# Patient Record
Sex: MALE | Race: WHITE | Employment: OTHER | URBAN - METROPOLITAN AREA
[De-identification: names, ages, dates, MRNs, and addresses within clinical notes are randomized per-mention and may not be internally consistent; named-entity substitution may affect disease eponyms.]

---

## 2018-10-31 ENCOUNTER — APPOINTMENT (OUTPATIENT)
Dept: GENERAL RADIOLOGY | Age: 78
End: 2018-10-31
Attending: EMERGENCY MEDICINE
Payer: MEDICARE

## 2018-10-31 ENCOUNTER — HOSPITAL ENCOUNTER (EMERGENCY)
Age: 78
Discharge: HOME | End: 2018-10-31
Attending: EMERGENCY MEDICINE
Payer: MEDICARE

## 2018-10-31 VITALS
WEIGHT: 190 LBS | HEIGHT: 74 IN | HEART RATE: 60 BPM | DIASTOLIC BLOOD PRESSURE: 66 MMHG | SYSTOLIC BLOOD PRESSURE: 121 MMHG | OXYGEN SATURATION: 98 % | BODY MASS INDEX: 24.38 KG/M2 | RESPIRATION RATE: 18 BRPM | TEMPERATURE: 98 F

## 2018-10-31 DIAGNOSIS — R07.9 NONSPECIFIC CHEST PAIN: Primary | ICD-10-CM

## 2018-10-31 PROBLEM — F03.90 DEMENTIA (HCC): Status: ACTIVE | Noted: 2018-10-31

## 2018-10-31 LAB
ALBUMIN SERPL-MCNC: 3.6 G/DL (ref 3.2–4.6)
ALBUMIN/GLOB SERPL: 0.8 {RATIO} (ref 1.2–3.5)
ALP SERPL-CCNC: 59 U/L (ref 50–136)
ALT SERPL-CCNC: 21 U/L (ref 12–65)
ANION GAP SERPL CALC-SCNC: 6 MMOL/L (ref 7–16)
AST SERPL-CCNC: 12 U/L (ref 15–37)
ATRIAL RATE: 73 BPM
BASOPHILS # BLD: 0 K/UL (ref 0–0.2)
BASOPHILS NFR BLD: 0 % (ref 0–2)
BILIRUB SERPL-MCNC: 0.5 MG/DL (ref 0.2–1.1)
BNP SERPL-MCNC: 36 PG/ML
BUN SERPL-MCNC: 15 MG/DL (ref 8–23)
CALCIUM SERPL-MCNC: 9.3 MG/DL (ref 8.3–10.4)
CALCULATED P AXIS, ECG09: 112 DEGREES
CALCULATED R AXIS, ECG10: -100 DEGREES
CALCULATED T AXIS, ECG11: 73 DEGREES
CHLORIDE SERPL-SCNC: 107 MMOL/L (ref 98–107)
CO2 SERPL-SCNC: 30 MMOL/L (ref 21–32)
CREAT SERPL-MCNC: 1.14 MG/DL (ref 0.8–1.5)
DIAGNOSIS, 93000: NORMAL
DIFFERENTIAL METHOD BLD: ABNORMAL
EOSINOPHIL # BLD: 0.4 K/UL (ref 0–0.8)
EOSINOPHIL NFR BLD: 4 % (ref 0.5–7.8)
ERYTHROCYTE [DISTWIDTH] IN BLOOD BY AUTOMATED COUNT: 12.8 %
GLOBULIN SER CALC-MCNC: 4.3 G/DL (ref 2.3–3.5)
GLUCOSE SERPL-MCNC: 75 MG/DL (ref 65–100)
HCT VFR BLD AUTO: 45.3 % (ref 41.1–50.3)
HGB BLD-MCNC: 14.6 G/DL (ref 13.6–17.2)
IMM GRANULOCYTES # BLD: 0 K/UL (ref 0–0.5)
IMM GRANULOCYTES NFR BLD AUTO: 0 % (ref 0–5)
LYMPHOCYTES # BLD: 1.5 K/UL (ref 0.5–4.6)
LYMPHOCYTES NFR BLD: 15 % (ref 13–44)
MCH RBC QN AUTO: 33.2 PG (ref 26.1–32.9)
MCHC RBC AUTO-ENTMCNC: 32.2 G/DL (ref 31.4–35)
MCV RBC AUTO: 103 FL (ref 79.6–97.8)
MONOCYTES # BLD: 0.8 K/UL (ref 0.1–1.3)
MONOCYTES NFR BLD: 9 % (ref 4–12)
NEUTS SEG # BLD: 6.7 K/UL (ref 1.7–8.2)
NEUTS SEG NFR BLD: 71 % (ref 43–78)
NRBC # BLD: 0 K/UL (ref 0–0.2)
P-R INTERVAL, ECG05: 136 MS
PLATELET # BLD AUTO: 293 K/UL (ref 150–450)
PMV BLD AUTO: 10 FL (ref 9.4–12.3)
POTASSIUM SERPL-SCNC: 4 MMOL/L (ref 3.5–5.1)
PROT SERPL-MCNC: 7.9 G/DL (ref 6.3–8.2)
Q-T INTERVAL, ECG07: 462 MS
QRS DURATION, ECG06: 160 MS
QTC CALCULATION (BEZET), ECG08: 508 MS
RBC # BLD AUTO: 4.4 M/UL (ref 4.23–5.6)
SODIUM SERPL-SCNC: 143 MMOL/L (ref 136–145)
TROPONIN I BLD-MCNC: 0.01 NG/ML (ref 0.02–0.05)
TROPONIN I SERPL-MCNC: <0.02 NG/ML (ref 0.02–0.05)
VENTRICULAR RATE, ECG03: 73 BPM
WBC # BLD AUTO: 9.5 K/UL (ref 4.3–11.1)

## 2018-10-31 PROCEDURE — 83880 ASSAY OF NATRIURETIC PEPTIDE: CPT

## 2018-10-31 PROCEDURE — 71046 X-RAY EXAM CHEST 2 VIEWS: CPT

## 2018-10-31 PROCEDURE — 84484 ASSAY OF TROPONIN QUANT: CPT

## 2018-10-31 PROCEDURE — 99285 EMERGENCY DEPT VISIT HI MDM: CPT | Performed by: EMERGENCY MEDICINE

## 2018-10-31 PROCEDURE — 80053 COMPREHEN METABOLIC PANEL: CPT

## 2018-10-31 PROCEDURE — 85025 COMPLETE CBC W/AUTO DIFF WBC: CPT

## 2018-10-31 PROCEDURE — 93005 ELECTROCARDIOGRAM TRACING: CPT | Performed by: EMERGENCY MEDICINE

## 2018-10-31 RX ORDER — NITROGLYCERIN 0.4 MG/1
0.4 TABLET SUBLINGUAL
Qty: 1 BOTTLE | Refills: 3 | Status: SHIPPED | OUTPATIENT
Start: 2018-10-31 | End: 2021-01-21 | Stop reason: SDUPTHER

## 2018-10-31 NOTE — ED NOTES
Cardiology consultation obtained. Repeat troponin was ordered and was negative patient will be followed up as an outpatient.

## 2018-10-31 NOTE — ED TRIAGE NOTES
Patient states that he is having chest pain and shortness of breath. Reports that he was seen at [facility] this weekend for the same and had a EKG and blood work. Patient d/c from that facility and told to follow up with cardiology. Patient with pacemaker/defibilator that has \"not gone off. \"

## 2018-10-31 NOTE — DISCHARGE INSTRUCTIONS

## 2018-10-31 NOTE — ED NOTES
I have reviewed discharge instructions with the patient. The patient verbalized understanding. Patient left ED via Discharge Method: ambulatory to Home with self. Opportunity for questions and clarification provided. Patient given 1 scripts. To continue your aftercare when you leave the hospital, you may receive an automated call from our care team to check in on how you are doing. This is a free service and part of our promise to provide the best care and service to meet your aftercare needs.  If you have questions, or wish to unsubscribe from this service please call [phone]. Thank you for Choosing our [facility] Emergency Department.

## 2018-10-31 NOTE — ED PROVIDER NOTES
[facility] pain and shortness of breath. The patient has a history of a nonischemic dilated cardiomyopathy and had a pacemaker defibrillator implanted 3-4 years ago. He's had no issues of recent illnesses or changes in medications but had an episode of chest discomfort or shortness of breath 4 days ago was seen at another emergency room and evaluated and released without evidence of ischemic disease. He had a second episode again this morning of unknown duration due to history of dementia. Patient is currently asymptomatic. The history is provided by the patient and the spouse. Chest Pain This is a recurrent problem. The current episode started more than 2 days ago. The problem has been resolved. The problem occurs every several days. The pain is associated with rest and normal activity. The patient is experiencing no pain. The quality of the pain is described as pressure-like. The pain does not radiate. Associated symptoms include shortness of breath. Pertinent negatives include no abdominal pain, no back pain, no claudication, no cough, no diaphoresis, no dizziness, no exertional chest pressure, no fever, no headaches, no hemoptysis, no irregular heartbeat, no leg pain, no lower extremity edema, no malaise/fatigue, no nausea, no near-syncope, no numbness, no orthopnea, no palpitations, no PND, no sputum production, no vomiting and no weakness. He has tried nothing for the symptoms. The treatment provided significant relief. Risk factors include cardiac disease. Past Medical History:  
Diagnosis Date  Arrhythmia PVVs  
 Chest pain  Dilated cardiomyopathy ([facility] 2/2/2016  Fatigue  Heart failure ([facility]  Heart murmur 2/2/2016  HLD (hyperlipidemia) 2/2/2016  Hypertension  LBBB (left bundle branch block) 2/2/2016  Palpitations  Psychiatric disorder  Ventricular premature beats 2/2/2016  Ventricular tachycardia ([facility] [address] 2016 Past Surgical History:  
Procedure Laterality Date  HX APPENDECTOMY  HX HEART CATHETERIZATION    
 HX ORTHOPAEDIC  2005  
 left total knee replacement Family History:  
Problem Relation Age of Onset  Stroke Mother  Heart Disease Father  of MI at 56yrs Social History Socioeconomic History  Marital status:  Spouse name: Not on file  Number of children: Not on file  Years of education: Not on file  Highest education level: Not on file Social Needs  Financial resource strain: Not on file  Food insecurity - worry: Not on file  Food insecurity - inability: Not on file  Transportation needs - medical: Not on file  Transportation needs - non-medical: Not on file Occupational History  Not on file Tobacco Use  Smoking status: Former Smoker Packs/day: 0.25 Last attempt to quit: 6/3/1975 Years since quittin.4  Smokeless tobacco: Never Used  Tobacco comment: Stopped smoking many years ago Substance and Sexual Activity  Alcohol use: Yes Comment: occ  Drug use: Not on file  Sexual activity: Not on file Other Topics Concern  Not on file Social History Narrative  Not on file ALLERGIES: Patient has no known allergies. Review of Systems Constitutional: Negative for diaphoresis, fever and malaise/fatigue. Respiratory: Positive for shortness of breath. Negative for cough, hemoptysis and sputum production. Cardiovascular: Positive for chest pain. Negative for palpitations, orthopnea, claudication, PND and near-syncope. Gastrointestinal: Negative for abdominal pain, nausea and vomiting. Musculoskeletal: Negative for back pain. Neurological: Negative for dizziness, weakness, numbness and headaches. All other systems reviewed and are negative. Vitals:  
 10/31/18 4869 BP: 133/77 Pulse: 72 Resp: 18  
 Temp: 97.5 °F (36.4 °C) SpO2: 97% Weight: 86.2 kg (190 lb) Height: 6' 2\" (1.88 m) Physical Exam  
Constitutional: He is oriented to person, place, and time. He appears well-developed and well-nourished. HENT:  
Head: Normocephalic and atraumatic. Eyes: EOM are normal. Pupils are equal, round, and reactive to light. Neck: Normal range of motion. Neck supple. Cardiovascular: Normal rate, regular rhythm and normal pulses. No extrasystoles are present. No systolic murmur is present. No diastolic murmur is present. Pulmonary/Chest: Effort normal and breath sounds normal.  
Abdominal: Soft. Bowel sounds are normal. He exhibits no distension. There is no tenderness. Musculoskeletal: Normal range of motion. Right lower leg: Normal. He exhibits no tenderness and no edema. Left lower leg: Normal. He exhibits no tenderness and no edema. Neurological: He is alert and oriented to person, place, and time. Skin: Skin is warm and dry. Capillary refill takes less than 2 seconds. Psychiatric: He has a normal mood and affect. His behavior is normal.  
Nursing note and vitals reviewed. MDM Number of Diagnoses or Management Options Amount and/or Complexity of Data Reviewed Clinical lab tests: ordered and reviewed Tests in the radiology section of CPT®: ordered and reviewed Independent visualization of images, tracings, or specimens: yes Risk of Complications, Morbidity, and/or Mortality Presenting problems: moderate Diagnostic procedures: moderate Management options: moderate Patient Progress Patient progress: stable Procedures

## 2022-03-18 PROBLEM — R07.9 CHEST PAIN: Status: ACTIVE | Noted: 2018-10-31

## 2022-03-18 PROBLEM — F03.90 DEMENTIA (HCC): Status: ACTIVE | Noted: 2018-10-31

## 2023-12-13 NOTE — CONSULTS
Addended by: LOUIS GARCIA on: 12/13/2023 09:24 AM     Modules accepted: Orders     [facility] Cardiology Consult     Attending Cardiologist:Dr. Venessa Malone     Primary Cardiologist:Dr. Ronda Lieberman    Primary Care Physician:Dr. Luis Hawkins    Subjective:     Elizabeth Sandoval is a 70 y.o. male with known hx of NICM, [place] BIV ICD in place, SHF, HTN, HLP and dementia. He presents to ER today with complaints of chest pain. He was at [facility] on Saturday with complaints of chest pain--serial enzymes were negative and was sent home. He apparently called his wife home from work with complaints of chest pressure with associated SOB. He was unable to discern length of episode or other associated symptoms. His wife relates that he has been doing well up until Saturday when he had CP. He typically does not complain and has been doing very well from CHF perspective. Review of last echo with improved LV function. Device was interrogated by [facility] in ER with normal operation and without arrhythmias. Past Medical History:   Diagnosis Date    Arrhythmia     PVVs    Chest pain     Dilated cardiomyopathy ([address] 2/2/2016    Fatigue     Heart failure (HCC)     Heart murmur 2/2/2016    HLD (hyperlipidemia) 2/2/2016    Hypertension     LBBB (left bundle branch block) 2/2/2016    Palpitations     Psychiatric disorder     Ventricular premature beats 2/2/2016    Ventricular tachycardia ([address] 2/2/2016      Past Surgical History:   Procedure Laterality Date    HX APPENDECTOMY      HX HEART CATHETERIZATION      HX ORTHOPAEDIC  2005    left total knee replacement      No current facility-administered medications for this encounter. Current Outpatient Medications   Medication Sig    spironolactone (ALDACTONE) 25 mg tablet Take 1 Tab by mouth daily.  carvedilol (COREG) 12.5 mg tablet Take 1 Tab by mouth daily.  amiodarone (CORDARONE) 200 mg tablet TAKE 1 /2 TABLET BY MOUTH 5 DAYS EVERY WEEK    simvastatin (ZOCOR) 20 mg tablet Take 1 Tab by mouth nightly.     rivastigmine tartrate (EXELON) 6 mg capsule Take  by mouth two (2) times daily (with meals).  MEMANTINE HCL (NAMENDA PO) Take 28 mg by mouth daily.  FLUoxetine (PROZAC) 20 mg capsule Take 20 mg by mouth daily.  aspirin delayed-release 81 mg tablet Take 81 mg by mouth daily.  vitamin e 400 unit tab Take  by mouth.  GLUCOSAMINE HCL/CHONDR LOAIZA A NA (OSTEO BI-FLEX PO) Take  by mouth. No Known Allergies   Social History     Tobacco Use    Smoking status: Former Smoker     Packs/day: 0.25     Last attempt to quit: 6/3/1975     Years since quittin.4    Smokeless tobacco: Never Used    Tobacco comment: Stopped smoking many years ago   Substance Use Topics    Alcohol use: Yes     Comment: occ      Family History   Problem Relation Age of Onset    Stroke Mother     Heart Disease Father          of MI at 56yrs        Review of Systems  Gen: Denies fever, chills, malaise or fatigue. Appetite good. HEENT: Denies frequent headaches, dizzyness, visual disturbances, Neck pain or swallowing difficulty  Lungs: Denies shortness of breath, hx of COPD, breathing problems  Cardiovascular: as above   GI: Denies hememesis, dark tarry stools, No prior Hx of GI bleed, Denies constipation  : Denies dysuria, no complaints of frequency, nocturia  Heme: No prior bleeding disorders, no prior Cancer  Neuro: Denies prior CVA, TIA. Endocrine: no diabetes, thyroid disorders  Psychiatric: Denies anxiety, or other psychiatric illnesses. Objective:     Visit Vitals  /77   Pulse 72   Temp 97.5 °F (36.4 °C)   Resp 18   Ht 6' 2\" (1.88 m)   Wt 86.2 kg (190 lb)   SpO2 97%   BMI 24.39 kg/m²     General:Alert, cooperative, no distress, appears stated age  Head: Normocephalic, without obvious abnormality, atraumatic. Eyes: Conjunctivae/corneas clear. PERRL, EOMs intact  Nose:Nares normal. Septum midline. Mucosa normal. No drainage or sinus tenderness.    Throat: Lips, mucosa, and tongue normal. Teeth and gums normal.   Neck: Supple, symmetrical, trachea midline,  no carotid bruit and no JVD. Lungs:Clear to auscultation bilaterally. Chest wall: No tenderness or deformity. Heart: Regular rate and rhythm, S1, S2 normal, no murmur, click, rub or gallop. Abdomen:Soft, non-tender. Bowel sounds normal. No masses, No organomegaly. Extremities: Extremities normal, atraumatic, no cyanosis or edema. Pulses: 2+ and symmetric all extremities. Skin: Skin color, texture, turgor normal. No rashes or lesions  Lymph nodes: Cervical, supraclavicular, and axillary nodes normal  Neurologic:No focal deficits identified                 ECG: AV paced     Data Review:     Recent Results (from the past 24 hour(s))   EKG, 12 LEAD, INITIAL    Collection Time: 10/31/18  9:02 AM   Result Value Ref Range    Ventricular Rate 73 BPM    Atrial Rate 73 BPM    P-R Interval 136 ms    QRS Duration 160 ms    Q-T Interval 462 ms    QTC Calculation (Bezet) 508 ms    Calculated P Axis 112 degrees    Calculated R Axis -100 degrees    Calculated T Axis 73 degrees    Diagnosis       AV dual-paced rhythm  Abnormal ECG  When compared with ECG of 04-JUN-2014 13:56,  No significant change was found    Confirmed by Count includes the [facility]  MD ()MADELINE ([id number]) on 10/31/2018 10:12:39   AM     CBC WITH AUTOMATED DIFF    Collection Time: 10/31/18  9:08 AM   Result Value Ref Range    WBC 9.5 4.3 - 11.1 K/uL    RBC 4.40 4.23 - 5.6 M/uL    HGB 14.6 13.6 - 17.2 g/dL    HCT 45.3 41.1 - 50.3 %    .0 (H) 79.6 - 97.8 FL    MCH 33.2 (H) 26.1 - 32.9 PG    MCHC 32.2 31.4 - 35.0 g/dL    RDW 12.8 %    PLATELET 872 013 - 633 K/uL    MPV 10.0 9.4 - 12.3 FL    ABSOLUTE NRBC 0.00 0.0 - 0.2 K/uL    DF AUTOMATED      NEUTROPHILS 71 43 - 78 %    LYMPHOCYTES 15 13 - 44 %    MONOCYTES 9 4.0 - 12.0 %    EOSINOPHILS 4 0.5 - 7.8 %    BASOPHILS 0 0.0 - 2.0 %    IMMATURE GRANULOCYTES 0 0.0 - 5.0 %    ABS. NEUTROPHILS 6.7 1.7 - 8.2 K/UL    ABS. LYMPHOCYTES 1.5 0.5 - 4.6 K/UL    ABS. MONOCYTES 0.8 0.1 - 1.3 K/UL    ABS. EOSINOPHILS 0.4 0.0 - 0.8 K/UL    ABS. BASOPHILS 0.0 0.0 - 0.2 K/UL    ABS. IMM. GRANS. 0.0 0.0 - 0.5 K/UL   METABOLIC PANEL, COMPREHENSIVE    Collection Time: 10/31/18  9:08 AM   Result Value Ref Range    Sodium 143 136 - 145 mmol/L    Potassium 4.0 3.5 - 5.1 mmol/L    Chloride 107 98 - 107 mmol/L    CO2 30 21 - 32 mmol/L    Anion gap 6 (L) 7 - 16 mmol/L    Glucose 75 65 - 100 mg/dL    BUN 15 8 - 23 MG/DL    Creatinine 1.14 0.8 - 1.5 MG/DL    GFR est AA >60 >60 ml/min/1.73m2    GFR est non-AA >60 >60 ml/min/1.73m2    Calcium 9.3 8.3 - 10.4 MG/DL    Bilirubin, total 0.5 0.2 - 1.1 MG/DL    ALT (SGPT) 21 12 - 65 U/L    AST (SGOT) 12 (L) 15 - 37 U/L    Alk. phosphatase 59 50 - 136 U/L    Protein, total 7.9 6.3 - 8.2 g/dL    Albumin 3.6 3.2 - 4.6 g/dL    Globulin 4.3 (H) 2.3 - 3.5 g/dL    A-G Ratio 0.8 (L) 1.2 - 3.5     TROPONIN I    Collection Time: 10/31/18  9:08 AM   Result Value Ref Range    Troponin-I, Qt. <0.02 (L) 0.02 - 0.05 NG/ML   BNP    Collection Time: 10/31/18  9:08 AM   Result Value Ref Range    BNP 36 pg/mL         Assessment / Plan     Principal Problem:    Chest pain (10/31/2018)--treatment strategies discussed with pt and wife, including possible LHC, or stress test. Wife has opted for pursuit of stress test. Arrangements have been made for him to have Ev[facility] Cash stress in office this Friday with early follow up with Dr. Stanton Luevano. He has been given ntg Rx. He is chronically on asa 81 mg.      Active Problems:    Chronic systolic heart failure (HCC) (6/4/2014)--stable on current meds, euvolemic on exam       Non-ischemic cardiomyopathy ([address] (6/4/2014)      Ventricular tachycardia (HCC) (2/2/2016)--no arrhythmias on ICD       HLD (hyperlipidemia) (2/2/2016)      Dementia (10/31/2018)--moderate               Aquiles Whyte NP